# Patient Record
Sex: MALE | Race: WHITE | NOT HISPANIC OR LATINO | Employment: UNEMPLOYED | URBAN - METROPOLITAN AREA
[De-identification: names, ages, dates, MRNs, and addresses within clinical notes are randomized per-mention and may not be internally consistent; named-entity substitution may affect disease eponyms.]

---

## 2018-01-25 DIAGNOSIS — Z01.20 ENCOUNTER FOR DENTAL EXAM AND CLEANING W/O ABNORMAL FINDINGS: ICD-10-CM

## 2021-08-10 ENCOUNTER — CLINICAL SUPPORT (OUTPATIENT)
Dept: DENTISTRY | Facility: CLINIC | Age: 41
End: 2021-08-10

## 2021-08-10 VITALS — DIASTOLIC BLOOD PRESSURE: 77 MMHG | SYSTOLIC BLOOD PRESSURE: 114 MMHG | TEMPERATURE: 97.9 F | HEART RATE: 65 BPM

## 2021-08-10 DIAGNOSIS — Z01.20 ENCOUNTER FOR DENTAL EXAM AND CLEANING W/O ABNORMAL FINDINGS: Primary | ICD-10-CM

## 2021-08-10 PROCEDURE — D1110 PROPHYLAXIS - ADULT: HCPCS

## 2021-08-10 PROCEDURE — D0120 PERIODIC ORAL EVALUATION - ESTABLISHED PATIENT: HCPCS | Performed by: DENTIST

## 2021-08-10 PROCEDURE — D0274 BITEWINGS - 4 RADIOGRAPHIC IMAGES: HCPCS

## 2021-08-10 NOTE — PROGRESS NOTES
RECALL EXAM, ADULT PROPHY,  4 BWX     CHIEF CONCERN: none   PAIN SCALE: 0  ASA CLASS: II  PLAQUE: moderate   CALCULUS: no calculus noted  BLEEDING: moderate   STAIN :none  ORAL HYGIENE: fair  PERIO: some gingival inflammation present  Hand scaled, polished and flossed  Oral Hygiene Instruction :  recommended brushing 2 x daily for 2 minutes MIN, recommended flossing daily, reviewed dietary precautions  soft tissue evaluation    Soft tissue exam:  soft tissue exam was normal  ExtraOral exam:   Extraoral exam was normal    Dr Domenic Morton exam=   Reviewed with patient clinical and radiographic  findings and parent verbalized understanding  All questions and concerns addressed  On 2/21/20 recommendation for  consult presented to mother due to severe attrition/bruxism  wear of teeth  Mom was not interested, stated pt had seen specialist     And was told nothing could be done  The same recommendation was presentedtoday but mom again would like to leave as is and treat PRN  Pt and mom report no pain  No infection     At present       REFERRALS: no referrals needed    CARIES FINDINGS: no caries noted    Next Recall: 6 month recall ( last fmx 1/25/18 + bwx today 8/10/21)

## 2022-02-21 ENCOUNTER — CLINICAL SUPPORT (OUTPATIENT)
Dept: DENTISTRY | Facility: CLINIC | Age: 42
End: 2022-02-21

## 2022-02-21 VITALS — HEART RATE: 60 BPM | DIASTOLIC BLOOD PRESSURE: 84 MMHG | SYSTOLIC BLOOD PRESSURE: 131 MMHG | TEMPERATURE: 98.2 F

## 2022-02-21 DIAGNOSIS — Z01.20 ENCOUNTER FOR DENTAL EXAM AND CLEANING W/O ABNORMAL FINDINGS: Primary | ICD-10-CM

## 2022-02-21 PROCEDURE — D1110 PROPHYLAXIS - ADULT: HCPCS

## 2022-02-21 PROCEDURE — D0120 PERIODIC ORAL EVALUATION - ESTABLISHED PATIENT: HCPCS | Performed by: DENTIST

## 2022-02-21 NOTE — PROGRESS NOTES
RECALL EXAM, ADULT PROPHY  Rev Med hx with mom: mom reports no new health changes, no changes in meds or allergies  CHIEF CONCERN: both patient and mom report no dental pain or concerns  PAIN SCALE: 0  ASA CLASS: I  PLAQUE: moderate- heavy   CALCULUS: light calculus   BLEEDING:moderate- heavy  STAIN :none  ORAL HYGIENE: fair  PERIO: gingiva inflamed and bleeding  Severe attrition present  Hand scaled, polished and flossed  Oral Hygiene Instruction :  recommended brushing 2 x daily for 2 minutes MIN, recommended flossing daily, reviewed dietary precautions  Soft tissue exam:  soft tissue exam was normal  ExtraOral exam:   Extraoral exam was normal    Dr Liza Adam exam=   Reviewed with patient clinical  findings and patient verbalized understanding  All questions and concerns addressed       REFERRALS: no referrals needed    CARIES FINDINGS:  no caries noted    Next Recall: 6 month recall     Last BWX: 08/10/2021  Last FMX : 01/25/2018

## 2022-10-12 ENCOUNTER — OFFICE VISIT (OUTPATIENT)
Dept: DENTISTRY | Facility: CLINIC | Age: 42
End: 2022-10-12

## 2022-10-12 DIAGNOSIS — Z01.21 ENCOUNTER FOR DENTAL EXAMINATION AND CLEANING WITH ABNORMAL FINDINGS: Primary | ICD-10-CM

## 2022-10-12 PROCEDURE — D0120 PERIODIC ORAL EVALUATION - ESTABLISHED PATIENT: HCPCS | Performed by: DENTIST

## 2022-10-12 PROCEDURE — D1110 PROPHYLAXIS - ADULT: HCPCS

## 2022-10-12 PROCEDURE — D0274 BITEWINGS - 4 RADIOGRAPHIC IMAGES: HCPCS

## 2022-10-12 PROCEDURE — D0603 CARIES RISK ASSESSMENT AND DOCUMENTATION, WITH A FINDING OF HIGH RISK: HCPCS

## 2022-10-12 RX ORDER — ALLOPURINOL 100 MG/1
1 TABLET ORAL DAILY
COMMUNITY
Start: 2022-09-07

## 2022-10-12 RX ORDER — BETAMETHASONE DIPROPIONATE 0.5 MG/G
1 CREAM TOPICAL 2 TIMES DAILY
COMMUNITY
Start: 2022-09-14 | End: 2023-09-14

## 2022-10-12 RX ORDER — CLINDAMYCIN PHOSPHATE 10 MG/G
GEL TOPICAL
COMMUNITY
Start: 2022-07-26

## 2022-10-12 RX ORDER — KETOCONAZOLE 20 MG/ML
SHAMPOO TOPICAL
COMMUNITY
Start: 2022-09-14

## 2022-10-12 RX ORDER — CHOLECALCIFEROL (VITAMIN D3) 125 MCG
CAPSULE ORAL
COMMUNITY

## 2022-10-12 NOTE — PROGRESS NOTES
PERIODIC EXAM, ADULT PROPHY,  4 BWX    REVIEWED MED HX: meds, allergies, health changes reviewed in Epic  Downs Syndrome  CHIEF CONCERN:  none   PAIN SCALE:  0  ASA CLASS:  II  PLAQUE: moderate mainly linguals dianne molars  CALCULUS:  none  BLEEDING:  Moderate upon scaling  STAIN :   none  ORAL HYGIENE:  fair  PERIO: Gen 4mm naima UR and LR due to inflammation  Hand scaled, polished and flossed  Oral Hygiene Instruction:  recommended brushing 2 x daily for 2 minutes MIN, recommended flossing daily, reviewed dietary precautions  Visual and Tactile Intraoral/ Extraoral evaluation: Oral and Oropharyngeal cancer evaluation  No findings     Dr Demetrice Garcia exam=   Reviewed with patient clinical and radiographic findings and patient verbalized understanding  All questions and concerns addressed  Generalized attrition, worn to gingival margin  Generalized pulp exposure  According to Mom, per our recommendations to see a  since 2020, Mom is not interested at this time  They had seen a specialist in the past, and the doctor stated nothing could be done and to wear a NG  Mom states patient will not keep a NG in and at this point she would like him to be maintained w/ 6mrcs  She is aware eventually he will need FM extraction  Patient is currently not in pain and has no pain when eating and drinking  REFERRALS: Mom declined referral to   Continue to monitor here      Next Visit: 6mrc    Last [facility] 1850: 10-12-22  Last Panorex/ FMX : 1-25-18

## 2023-05-09 NOTE — PROGRESS NOTES
RECALL EXAM, ADULT PROPHY      REVIEWED MED HX: meds, allergies, health changes reviewed in EPIC  CHIEF CONCERN:  none   PAIN SCALE:  0  ASA CLASS:  I  PLAQUE:  mild    moderate    heavy  *  CALCULUS:  no calculus noted     light   moderate    heavy *  BLEEDING:   none    light   moderate   heavy  STAIN :   none    light    moderate   heavy*  ORAL HYGIENE:  good    fair   poor/needs improvement  PERIO:     Hand scaled, polished and flossed  Used Cavitron  Visual and Tactile Intraoral/ Extraoral evaluation: Oral and Oropharyngeal cancer evaluation  No findings     Dr Elenita Terrell  exam=   Reviewed with patient clinical and radiographic findings and patient verbalized understanding  All questions and concerns addressed       REFERRALS: no referrals needed    CARIES FINDINGS:        TREATMENT  PLAN :   1)     Next Recall: 6 month recall     Last BWX: 10/12/22  Last  FMX : 1/25/18

## 2023-05-10 ENCOUNTER — OFFICE VISIT (OUTPATIENT)
Dept: DENTISTRY | Facility: CLINIC | Age: 43
End: 2023-05-10

## 2023-05-10 VITALS — HEART RATE: 66 BPM | DIASTOLIC BLOOD PRESSURE: 79 MMHG | SYSTOLIC BLOOD PRESSURE: 121 MMHG

## 2023-05-10 DIAGNOSIS — Z01.20 ENCOUNTER FOR DENTAL EXAM AND CLEANING W/O ABNORMAL FINDINGS: Primary | ICD-10-CM

## 2023-05-10 NOTE — DENTAL PROCEDURE DETAILS
RECALL EXAM, ADULT PROPHY      REVIEWED MED HX: meds, allergies, health changes reviewed in EPIC  CHIEF CONCERN:  none   PAIN SCALE:  0  ASA CLASS:  II  PLAQUE:  moderate    CALCULUS: light    BLEEDING:  heavy  STAIN :   light    ORAL HYGIENE: poor/needs improvement  PERIO: inflammation     Hand scaled, polished and flossed  Used Cavitron  Visual and Tactile Intraoral/ Extraoral evaluation: Oral and Oropharyngeal cancer evaluation  No findings     Dr Gee Badillo exam=   Reviewed with patient clinical and radiographic findings and patient verbalized understanding  All questions and concerns addressed  REFERRALS: no referrals needed  Recommended referral to prosthodontics  Mom prefers to hold off for now       CARIES FINDINGS: no decay noted       TREATMENT  PLAN :   1) ext #12 root tip present/broken Lingual #11    Next Recall: 6 month recall     Last BWX: 10/12/22  Last  FMX : 1/25/18

## 2023-12-18 ENCOUNTER — OFFICE VISIT (OUTPATIENT)
Dept: DENTISTRY | Facility: CLINIC | Age: 43
End: 2023-12-18

## 2023-12-18 DIAGNOSIS — Z01.20 ENCOUNTER FOR DENTAL EXAM AND CLEANING W/O ABNORMAL FINDINGS: Primary | ICD-10-CM

## 2023-12-18 PROCEDURE — D1110 PROPHYLAXIS - ADULT: HCPCS

## 2023-12-18 PROCEDURE — D0274 BITEWINGS - 4 RADIOGRAPHIC IMAGES: HCPCS

## 2023-12-18 PROCEDURE — D0120 PERIODIC ORAL EVALUATION - ESTABLISHED PATIENT: HCPCS

## 2023-12-18 NOTE — PROGRESS NOTES
PERIODIC EXAM, ADULT PROPHY AND 4 BWX   REVIEWED MED HX: meds, allergies, health changes reviewed in [facility]. All consents signed.  CHIEF CONCERN:  none   PAIN SCALE:  0  ASA CLASS:  II  PLAQUE:   moderate    CALCULUS:  moderate    BLEEDING:    moderate    STAIN :   none    ORAL HYGIENE:     fair    PERIO:     Hand scaled, polished and flossed. Used Cavitron.     Oral Hygiene Instruction:  recommended brushing 2 x daily for 2 minutes MIN, recommended flossing daily, reviewed dietary precautions.  Dispensed: toothbrush, toothpaste and floss    Visual and Tactile Intraoral/ Extraoral evaluation: Oral and Oropharyngeal cancer evaluation. No findings       ***  exam=   Reviewed with patient clinical and radiographic findings and patient verbalized understanding. All questions and concerns addressed.     REFERRALS: no referrals needed    CARIES FINDINGS:        TREATMENT  PLAN :   1)     Next Recall: 6 month recall     Last BWX: 12/18/23  Last Panorex/ FMX : 1/25/18

## 2023-12-18 NOTE — DENTAL PROCEDURE DETAILS
Linnea Gasca presents for a Periodic exam. Verbal consent for treatment given in addition to the forms.     Reviewed health history - Patient is ASA II  Consents signed: Yes     Perio: Normal  Pain Scale: 0  Caries Assessment: Low  Radiographs: Bitewings x4         PERIODIC EXAM, ADULT PROPHY AND 4 BWX   REVIEWED MED HX: meds, allergies, health changes reviewed in [facility]. All consents signed.  CHIEF CONCERN:  none   PAIN SCALE:  0  ASA CLASS:  II  PLAQUE:   moderate    CALCULUS:  moderate    BLEEDING:    moderate    STAIN :   none    ORAL HYGIENE:     fair    PERIO: SEVERE ATTRITION    Hand scaled, polished and flossed. Used Cavitron.     Oral Hygiene Instruction:  recommended brushing 2 x daily for 2 minutes MIN, recommended flossing daily, reviewed dietary precautions.  Dispensed: toothbrush, toothpaste and floss    Visual and Tactile Intraoral/ Extraoral evaluation: Oral and Oropharyngeal cancer evaluation. No findings     Dr. CANTRELL  exam=   Reviewed with patient clinical and radiographic findings and patient verbalized understanding. All questions and concerns addressed.     REFERRALS: no referrals needed    CARIES FINDINGS: Severe attrition on all teeth. 2/21/20  spoke with mother regarding referral to  and also niteguard use. Mom states was at  and there is nothing they can do. Mom prefers to treat PRN and ext/ dentures for future tx.     Next Recall: 6 month recall     Last BWX: 12/18/23  Last Panorex/ FMX : 1/25/18

## 2024-01-18 ENCOUNTER — OFFICE VISIT (OUTPATIENT)
Dept: URGENT CARE | Facility: CLINIC | Age: 44
End: 2024-01-18
Payer: COMMERCIAL

## 2024-01-18 DIAGNOSIS — J01.00 ACUTE MAXILLARY SINUSITIS, RECURRENCE NOT SPECIFIED: Primary | ICD-10-CM

## 2024-01-18 PROCEDURE — 99213 OFFICE O/P EST LOW 20 MIN: CPT | Performed by: EMERGENCY MEDICINE

## 2024-01-18 RX ORDER — AMOXICILLIN 500 MG/1
500 CAPSULE ORAL 3 TIMES DAILY
Qty: 21 CAPSULE | Refills: 0 | Status: SHIPPED | OUTPATIENT
Start: 2024-01-18 | End: 2024-01-25

## 2024-01-18 NOTE — PROGRESS NOTES
[facility]        NAME: Linnea Gasca is a 43 y.o. male  : 1980    MRN: [id number]  DATE: 2024  TIME: 10:51 AM    Assessment and Plan   Acute maxillary sinusitis, recurrence not specified [J01.00]  1. Acute maxillary sinusitis, recurrence not specified  amoxicillin (AMOXIL) 500 mg capsule            Patient Instructions     Patient Instructions    Encourage liquids and rest  F/u with PCP in 2-3 days  Continue decongestants      Follow up with PCP in 3-5 days.  Proceed to  ER if symptoms worsen.    Chief Complaint     Chief Complaint   Patient presents with    Nasal Congestion     X 6 days. Taking decongestants w/o relief. .          History of Present Illness       44 yo w male with hx of Down's syndrome presents  with cc from mom that pt. Has been congested with productive white thick mucous.  No fever/chills.        Review of Systems   Review of Systems   Constitutional:  Negative for chills and fever.   HENT:  Positive for congestion, sinus pressure and sinus pain. Negative for rhinorrhea, sneezing, sore throat and trouble swallowing.    Eyes:  Negative for discharge and visual disturbance.   Respiratory:  Negative for shortness of breath and wheezing.    Cardiovascular:  Negative for chest pain and palpitations.   Gastrointestinal:  Negative for abdominal pain and vomiting.   Endocrine: Negative for polydipsia and polyuria.   Genitourinary:  Negative for dysuria and hematuria.   Musculoskeletal:  Negative for arthralgias, gait problem and neck stiffness.   Skin:  Negative for rash and wound.   Neurological:  Negative for dizziness and headaches.   Psychiatric/Behavioral:  Negative for confusion and suicidal ideas.          Current Medications       Current Outpatient Medications:     allopurinol (ZYLOPRIM) 100 mg tablet, Take 1 tablet by mouth daily, Disp: , Rfl:     amoxicillin (AMOXIL) 500 mg capsule, Take 1 capsule (500 mg total) by mouth 3 (three) times a day for 7 days, Disp:  21 capsule, Rfl: 0    Cholecalciferol (Vitamin D3) 50 MCG (2000 UT) TABS, , Disp: , Rfl:     clindamycin (CLINDAGEL) 1 % gel, Apply Topically twice a day (Face), Disp: , Rfl:     ketoconazole (NIZORAL) 2 % shampoo, apply topically twice a day to DAMP SKIN, LATHER, LEAVE ON 5 MINUTES, AND RINSE, Disp: , Rfl:     betamethasone dipropionate (DIPROSONE) 0.05 % cream, Apply 1 application topically 2 (two) times a day, Disp: , Rfl:     Current Allergies     Allergies as of 01/18/2024 - Reviewed 01/18/2024   Allergen Reaction Noted    Sweet potato - food allergy  01/16/2016            The following portions of the patient's history were reviewed and updated as appropriate: allergies, current medications, past family history, past medical history, past social history, past surgical history and problem list.     Past Medical History:   Diagnosis Date    Gout        History reviewed. No pertinent surgical history.    History reviewed. No pertinent family history.      Medications have been verified.        Objective   There were no vitals taken for this visit.       Physical Exam     Physical Exam  Vitals and nursing note reviewed.   Constitutional:       General: He is not in acute distress.     Appearance: Normal appearance. He is not ill-appearing, toxic-appearing or diaphoretic.      Comments: 44 yo male sitting on the exam table with erythematous facies around nasal region.   HENT:      Head: Normocephalic and atraumatic.      Right Ear: Tympanic membrane normal.      Left Ear: Tympanic membrane normal.      Nose: Congestion present.      Mouth/Throat:      Mouth: Mucous membranes are moist.      Pharynx: Oropharynx is clear. No oropharyngeal exudate or posterior oropharyngeal erythema.   Eyes:      Extraocular Movements: Extraocular movements intact.      Pupils: Pupils are equal, round, and reactive to light.   Neck:      Vascular: No carotid bruit.   Cardiovascular:      Rate and Rhythm: Normal rate and regular  rhythm.      Pulses: Normal pulses.   Pulmonary:      Effort: Pulmonary effort is normal.   Abdominal:      General: Abdomen is flat. Bowel sounds are normal. There is no distension.      Palpations: Abdomen is soft. There is no mass.      Tenderness: There is no abdominal tenderness. There is no right CVA tenderness, left CVA tenderness, guarding or rebound.      Hernia: No hernia is present.   Musculoskeletal:         General: No swelling, tenderness, deformity or signs of injury. Normal range of motion.      Cervical back: Normal range of motion and neck supple. No rigidity. No muscular tenderness.      Right lower leg: No edema.      Left lower leg: No edema.   Lymphadenopathy:      Cervical: No cervical adenopathy.   Skin:     General: Skin is warm and dry.      Coloration: Skin is not jaundiced or pale.      Findings: No bruising, erythema, lesion or rash.   Neurological:      General: No focal deficit present.      Mental Status: He is alert.      Cranial Nerves: No cranial nerve deficit.      Motor: No weakness.      Comments: + Down's syndrome   Psychiatric:         Mood and Affect: Mood normal.

## 2024-01-29 ENCOUNTER — APPOINTMENT (OUTPATIENT)
Dept: LAB | Facility: CLINIC | Age: 44
End: 2024-01-29
Payer: MEDICARE

## 2024-06-25 NOTE — PROGRESS NOTES
PERIODIC EXAM, ADULT PROPHY , (no xrays due )   REVIEWED MED HX: meds, allergies, health changes reviewed in [facility]. All consents signed.  CHIEF CONCERN: no dental pain or concerns  PAIN SCALE:  0  ASA CLASS:  II  PLAQUE:  {Plaque Level:97604}  CALCULUS:  {None light moderate heavy:42020}  BLEEDING:   {None light moderate heavy:01505}  STAIN :   {None light moderate heavy:06948}      PERIO:     Hygiene Procedures:  Scaled, Polished, Flossed and Used Cavitron    Oral Hygiene Instruction: Brushing Minimum 2x daily for 2 minutes, daily flossing and Reviewed dietary precautions    Dispensed: Toothbrush, Toothpaste, and Floss    Visual and Tactile Intraoral/ Extraoral evaluation: Oral and Oropharyngeal cancer evaluation. No findings     {Exam:48547}   Reviewed with patient clinical and radiographic findings and patient verbalized understanding. All questions and concerns addressed.     REFERRALS: none    CARIES FINDINGS:        TREATMENT  PLAN :   NV:     Next Recall: 6 month recall     Last BWX: 12/18/23  Last FMX : 1/25/18

## 2024-06-26 ENCOUNTER — OFFICE VISIT (OUTPATIENT)
Dept: DENTISTRY | Facility: CLINIC | Age: 44
End: 2024-06-26

## 2024-06-26 VITALS — DIASTOLIC BLOOD PRESSURE: 69 MMHG | HEART RATE: 71 BPM | SYSTOLIC BLOOD PRESSURE: 105 MMHG

## 2024-06-26 DIAGNOSIS — Z01.20 ENCOUNTER FOR DENTAL EXAM AND CLEANING W/O ABNORMAL FINDINGS: Primary | ICD-10-CM

## 2024-06-26 PROCEDURE — D0120 PERIODIC ORAL EVALUATION - ESTABLISHED PATIENT: HCPCS | Performed by: DENTIST

## 2024-06-26 PROCEDURE — D1110 PROPHYLAXIS - ADULT: HCPCS

## 2024-06-26 NOTE — DENTAL PROCEDURE DETAILS
PERIODIC EXAM, ADULT PROPHY , (no xrays due)   REVIEWED MED HX: meds, allergies, health changes reviewed in [facility]. All consents signed.  CHIEF CONCERN: no dental pain or concerns  PAIN SCALE:  0  ASA CLASS:  II  PLAQUE:  moderate  CALCULUS:  none  BLEEDING:   moderate  STAIN :   none      PERIO: gingivitis present    Hygiene Procedures:  Scaled, Polished, Flossed and Used Cavitron    Oral Hygiene Instruction: Brushing Minimum 2x daily for 2 minutes, daily flossing and Reviewed dietary precautions    Dispensed: Toothbrush, Toothpaste, and Floss    Visual and Tactile Intraoral/ Extraoral evaluation: Oral and Oropharyngeal cancer evaluation. No findings     Dr. Ponce   Reviewed with patient clinical and radiographic findings and patient verbalized understanding. All questions and concerns addressed.     REFERRALS: none    CARIES FINDINGS: some teeth need extraction. Worn down. Severe attrition present generalized. Previously discussed with mom need to go to specialist / for full mouth rehab/ advanced case but mom prefers not to do anything at this time.   #8 PAP -recommended extraction, but mom prefers to hold off for now.    Next Recall: 6 month recall / update fmx    Last BWX: 12/18/23  Last FMX : 1/25/18

## 2024-10-16 ENCOUNTER — OFFICE VISIT (OUTPATIENT)
Dept: FAMILY MEDICINE CLINIC | Facility: CLINIC | Age: 44
End: 2024-10-16
Payer: MEDICARE

## 2024-10-16 VITALS
HEIGHT: 64 IN | DIASTOLIC BLOOD PRESSURE: 74 MMHG | BODY MASS INDEX: 35.17 KG/M2 | WEIGHT: 206 LBS | TEMPERATURE: 97.9 F | HEART RATE: 76 BPM | RESPIRATION RATE: 18 BRPM | SYSTOLIC BLOOD PRESSURE: 132 MMHG

## 2024-10-16 DIAGNOSIS — Q90.9 DOWN SYNDROME: ICD-10-CM

## 2024-10-16 DIAGNOSIS — M10.9 GOUT, UNSPECIFIED CAUSE, UNSPECIFIED CHRONICITY, UNSPECIFIED SITE: ICD-10-CM

## 2024-10-16 PROCEDURE — G2211 COMPLEX E/M VISIT ADD ON: HCPCS | Performed by: FAMILY MEDICINE

## 2024-10-16 PROCEDURE — 99214 OFFICE O/P EST MOD 30 MIN: CPT | Performed by: FAMILY MEDICINE

## 2024-10-16 RX ORDER — LORATADINE 10 MG/1
CAPSULE, LIQUID FILLED ORAL
COMMUNITY

## 2024-10-16 RX ORDER — LEVOCETIRIZINE DIHYDROCHLORIDE 5 MG/1
1 TABLET, FILM COATED ORAL EVERY EVENING
COMMUNITY
Start: 2024-07-24

## 2024-10-16 RX ORDER — CLOBETASOL PROPIONATE 0.5 MG/ML
1 SOLUTION TOPICAL 2 TIMES DAILY
COMMUNITY
Start: 2024-03-18 | End: 2025-03-18

## 2024-10-16 NOTE — PROGRESS NOTES
Ambulatory Visit  Name: Linnea Gasca      : 1980      MRN: [id number]  Encounter Provider: Nicolas Wyatt MD  Encounter Date: 10/16/2024   Encounter department: [facility]    Assessment & Plan  BMI 33.0-33.9,adult  Patient is stable  and will continue present plan of care and reassess at next routine visit. All questions about this problem from patient were answered today.          Down syndrome  Patient is stable  and will continue present plan of care and reassess at next routine visit. All questions about this problem from patient were answered today.          Gout, unspecified cause, unspecified chronicity, unspecified site  Pt to continue with current gout meds and avoid ETOH, foods with lots of purines and increase water intake. All patient questions about this condition answered today            Depression Screening and Follow-up Plan: Patient was screened for depression during today's encounter. They screened negative with a PHQ-2 score of 0.    History of Present Illness     HPI      Review of Systems   Constitutional:  Negative for activity change, appetite change, chills, fatigue, fever and unexpected weight change.   HENT:  Negative for congestion, ear pain, hearing loss, mouth sores, postnasal drip, sinus pressure, sinus pain, sneezing and sore throat.    Respiratory:  Negative for apnea, cough, shortness of breath and wheezing.    Cardiovascular:  Negative for chest pain, palpitations and leg swelling.   Gastrointestinal:  Negative for abdominal pain, constipation, diarrhea, nausea and vomiting.   Endocrine: Negative for cold intolerance and heat intolerance.   Genitourinary:  Negative for dysuria, frequency and hematuria.   Musculoskeletal:  Negative for arthralgias, back pain, gait problem, joint swelling and neck pain.   Skin:  Negative for rash.   Neurological:  Negative for dizziness, weakness and numbness.   Hematological:  Does not bruise/bleed easily.  "  Psychiatric/Behavioral:  Negative for agitation, behavioral problems, confusion, hallucinations and sleep disturbance. The patient is not nervous/anxious.         Limited behavior issues           Objective     /74 (BP Location: Left arm, Patient Position: Sitting, Cuff Size: Standard)   Pulse 76   Temp 97.9 °F (36.6 °C)   Resp 18   Ht 5' 4\" (1.626 m)   Wt 93.4 kg (206 lb)   BMI 35.36 kg/m²     Physical Exam  Constitutional:       Appearance: He is well-developed. He is obese.   HENT:      Head: Normocephalic and atraumatic.      Right Ear: External ear normal.      Left Ear: External ear normal.      Nose: Nose normal.      Mouth/Throat:      Pharynx: Oropharynx is clear. No oropharyngeal exudate.   Eyes:      General: No scleral icterus.     Conjunctiva/sclera: Conjunctivae normal.      Pupils: Pupils are equal, round, and reactive to light.   Cardiovascular:      Rate and Rhythm: Normal rate and regular rhythm.      Heart sounds: Murmur heard.   Pulmonary:      Effort: Pulmonary effort is normal.      Breath sounds: Normal breath sounds. No wheezing or rales.   Abdominal:      General: Bowel sounds are normal.      Palpations: Abdomen is soft.      Tenderness: There is no abdominal tenderness. There is no guarding.   Musculoskeletal:         General: Normal range of motion.      Cervical back: Normal range of motion and neck supple.   Skin:     General: Skin is warm and dry.      Findings: No erythema or rash.   Neurological:      Mental Status: He is alert and oriented to person, place, and time. Mental status is at baseline.      Comments: Limited due to DOWNs   Psychiatric:         Mood and Affect: Mood normal.         Behavior: Behavior normal.         "

## 2024-10-16 NOTE — ASSESSMENT & PLAN NOTE
Pt to continue with current gout meds and avoid ETOH, foods with lots of purines and increase water intake. All patient questions about this condition answered today

## 2025-01-05 DIAGNOSIS — L70.9 ACNE, UNSPECIFIED ACNE TYPE: ICD-10-CM

## 2025-01-05 DIAGNOSIS — M10.9 GOUT, UNSPECIFIED CAUSE, UNSPECIFIED CHRONICITY, UNSPECIFIED SITE: Primary | ICD-10-CM

## 2025-01-05 DIAGNOSIS — B35.0 TINEA CAPITIS: ICD-10-CM

## 2025-01-05 DIAGNOSIS — B35.6 TINEA CRURIS: ICD-10-CM

## 2025-01-05 DIAGNOSIS — J30.1 SEASONAL ALLERGIC RHINITIS DUE TO POLLEN: ICD-10-CM

## 2025-01-06 ENCOUNTER — OFFICE VISIT (OUTPATIENT)
Dept: DENTISTRY | Facility: CLINIC | Age: 45
End: 2025-01-06

## 2025-01-06 VITALS — DIASTOLIC BLOOD PRESSURE: 78 MMHG | SYSTOLIC BLOOD PRESSURE: 114 MMHG | HEART RATE: 78 BPM

## 2025-01-06 DIAGNOSIS — K03.0: ICD-10-CM

## 2025-01-06 DIAGNOSIS — Z01.20 ENCOUNTER FOR DENTAL EXAM AND CLEANING W/O ABNORMAL FINDINGS: Primary | ICD-10-CM

## 2025-01-06 PROCEDURE — D0220 INTRAORAL - PERIAPICAL FIRST RADIOGRAPHIC IMAGE: HCPCS

## 2025-01-06 PROCEDURE — D0120 PERIODIC ORAL EVALUATION - ESTABLISHED PATIENT: HCPCS

## 2025-01-06 PROCEDURE — D0274 BITEWINGS - 4 RADIOGRAPHIC IMAGES: HCPCS

## 2025-01-06 PROCEDURE — D1110 PROPHYLAXIS - ADULT: HCPCS

## 2025-01-06 NOTE — PROGRESS NOTES
PERIODIC EXAM, ADULT PROPHY , 4 BWX, 1 PA UL (broken tooth)   REVIEWED MED HX: meds, allergies, health changes reviewed in [facility]. All consents signed.  CHIEF CONCERN: no dental pain or concerns  PAIN SCALE:  0  ASA CLASS:  II  PLAQUE:  mild  CALCULUS:  none  BLEEDING:   heavy  STAIN :   none      PERIO: a lot of bleeding. Severe attrition present    Hygiene Procedures:  Scaled, Polished, Flossed and Placement of Wonderful Fl varnish    Oral Hygiene Instruction: Brushing Minimum 2x daily for 2 minutes, daily flossing and Reviewed dietary precautions    Dispensed: Toothbrush, Toothpaste, Floss    Visual and Tactile Intraoral/ Extraoral evaluation: Oral and Oropharyngeal cancer evaluation. No findings     Dr. Barrett   Reviewed with patient clinical and radiographic findings and patient verbalized understanding. All questions and concerns addressed.   --> severe attrition on all teeth, #8 and 12 previously tx planned for extraction. Added #13 (tooth broken into gumline)    REFERRALS: none    CARIES FINDINGS: no decay noted       TREATMENT  PLAN :   NV: extractions# 8, 12, 13 - brought mom to tx room showed Broken tooth #13  - mom asked re: if all exts done at once. Mom concerned if pt would keep cause in with bleeding.   Next Recall: 6 month recall     Last BWX:1/6/25  Last Panorex/ FMX : 1/25/18

## 2025-01-07 RX ORDER — ALLOPURINOL 100 MG/1
100 TABLET ORAL DAILY
Qty: 30 TABLET | Refills: 0 | Status: SHIPPED | OUTPATIENT
Start: 2025-01-07

## 2025-01-07 RX ORDER — KETOCONAZOLE 20 MG/ML
1 SHAMPOO, SUSPENSION TOPICAL 3 TIMES WEEKLY
Qty: 120 ML | Refills: 0 | Status: SHIPPED | OUTPATIENT
Start: 2025-01-08

## 2025-01-07 RX ORDER — CLINDAMYCIN PHOSPHATE 10 MG/G
GEL TOPICAL 2 TIMES DAILY
Qty: 60 G | Refills: 2 | Status: SHIPPED | OUTPATIENT
Start: 2025-01-07

## 2025-01-07 RX ORDER — LEVOCETIRIZINE DIHYDROCHLORIDE 5 MG/1
5 TABLET, FILM COATED ORAL EVERY EVENING
Qty: 30 TABLET | Refills: 0 | Status: SHIPPED | OUTPATIENT
Start: 2025-01-07

## 2025-02-12 ENCOUNTER — OFFICE VISIT (OUTPATIENT)
Dept: DENTISTRY | Facility: CLINIC | Age: 45
End: 2025-02-12

## 2025-02-12 DIAGNOSIS — K03.0: Primary | ICD-10-CM

## 2025-02-12 PROCEDURE — D7140 EXTRACTION, ERUPTED TOOTH OR EXPOSED ROOT (ELEVATION AND/OR FORCEPS REMOVAL): HCPCS

## 2025-02-12 NOTE — PROGRESS NOTES
.Extraction #8, 12, 13    Linnea Gasca 44 y.o. male presents with mom to [facility] for extraction #8, 12, 13  PMH reviewed, no changes, ASA II. Significant medical history: Down Syndrome. Significant allergies: none. Significant medications: none.  Pain level 0/10    Diagnosis:  Teeth #8, 12, 13 indicated for extraction due to Retained root tip.    Consent:  Risks of specific procedure: pain, bleeding, swelling, infection, tooth fracturing to point of requiring surgical removal, damage to adjacent teeth and/or restorations on them.  Risks of any dental procedure: post procedural pain or sensitivity, local anesthetic side effects, allergic reaction to dental materials and medications, breakage of local anesthetic needle, aspiration of small dental tools, injury to nearby hard and soft tissues and anatomical structures.  Benefits: relieve pain or underlying infection, prevent future or further progression of infection  Alternatives:  no tx.  Tx plan for extraction #8, 12, 13 reviewed, pt given opportunity to ask questions, all questions answered to degree of medical and dental certainty.  Patient understands and consent given by self and mom via signed oral surgery informed consent.    Universal Protocol  Other Assisting Provider: Yes, Mabel (assistant)  Verbal consent obtained? YES  Written consent obtained?  YES  Risks, benefits and alternatives discussed?: YES  Consent given by: self and mom  Time Out  Immediately prior to the procedure a time out was called: YES  Time Out:  Time Out performed at:  10:25 AM  A time out verifies correct patient, procedure, equipment, support staff and site/side marked as required.  Patient states understanding of procedure being performed: YES  Patient's understanding of procedure matches consent: YES  Procedure consent matches procedure scheduled: YES  Test results available and properly labeled: N/A  Site  Verified with the patient  YES  Radiology Images displayed and confirmed.  If  images not available, report reviewed:  YES  Required items - Required blood products, implants, devices and special equipment available: YES  Patient identity confirmed:  YES    Anesthesia:  Topical 20% benzocaine.  1 carps 4% Septocaine 1:100k epi via buccal infiltration and palatal/lingual infiltration.    Patient stated that he was shaking after the injection, so I sat his up and put a cold water paper towel on his forehead and ice pack on the back of neck. Gave patient water to rinse mouth with and gave him a few minutes to breath until he felt better.    Patient started to feel better after about 5-10 mins and he agreed to continuing the extractions. After extractions were completed and sterile saline was used, patient started to feel shakey again    Procedure details:  Reflected gingiva with periosteal elevator.  Elevated, and extracted #8, 12, 13 with straight elevator and forceps.  Socket curetted and irrigated with sterile saline.  Manual alveolar compression with gauze 30 seconds. Hemostasis achieved.    Post-op instructions given verbally and on paper.  Patient given ice and gauze.    Let patient take his time before getting out of the chair. He left stable and said that he felt much better. Discussed with mom that he was feeling light headed and anxious and just to keep an eye on him    Rx: None.    Patient dismissed ambulatory and alert.    NV: 6 month recall.    Attending: Dr. Ponce was present in clinic.

## 2025-02-25 DIAGNOSIS — M10.9 GOUT, UNSPECIFIED CAUSE, UNSPECIFIED CHRONICITY, UNSPECIFIED SITE: ICD-10-CM

## 2025-02-26 RX ORDER — ALLOPURINOL 100 MG/1
100 TABLET ORAL DAILY
Qty: 30 TABLET | Refills: 5 | Status: SHIPPED | OUTPATIENT
Start: 2025-02-26

## 2025-02-26 RX ORDER — ALLOPURINOL 100 MG/1
100 TABLET ORAL DAILY
Qty: 30 TABLET | Refills: 0 | Status: SHIPPED | OUTPATIENT
Start: 2025-02-26

## 2025-03-02 DIAGNOSIS — L70.9 ACNE, UNSPECIFIED ACNE TYPE: ICD-10-CM

## 2025-03-03 RX ORDER — CLINDAMYCIN PHOSPHATE 10 MG/G
GEL TOPICAL 2 TIMES DAILY
Qty: 60 G | Refills: 0 | Status: SHIPPED | OUTPATIENT
Start: 2025-03-03

## 2025-04-02 ENCOUNTER — RA CDI HCC (OUTPATIENT)
Dept: OTHER | Facility: HOSPITAL | Age: 45
End: 2025-04-02

## 2025-04-09 ENCOUNTER — OFFICE VISIT (OUTPATIENT)
Dept: FAMILY MEDICINE CLINIC | Facility: CLINIC | Age: 45
End: 2025-04-09
Payer: MEDICARE

## 2025-04-09 VITALS
DIASTOLIC BLOOD PRESSURE: 70 MMHG | SYSTOLIC BLOOD PRESSURE: 118 MMHG | WEIGHT: 203 LBS | HEIGHT: 64 IN | HEART RATE: 66 BPM | OXYGEN SATURATION: 94 % | TEMPERATURE: 98.2 F | BODY MASS INDEX: 34.66 KG/M2

## 2025-04-09 DIAGNOSIS — L70.8 OTHER ACNE: ICD-10-CM

## 2025-04-09 DIAGNOSIS — L70.9 ACNE, UNSPECIFIED ACNE TYPE: ICD-10-CM

## 2025-04-09 DIAGNOSIS — M1A.9XX0 CHRONIC GOUT WITHOUT TOPHUS, UNSPECIFIED CAUSE, UNSPECIFIED SITE: ICD-10-CM

## 2025-04-09 DIAGNOSIS — R53.83 OTHER FATIGUE: ICD-10-CM

## 2025-04-09 DIAGNOSIS — L21.9 SEBORRHEIC DERMATITIS OF SCALP: ICD-10-CM

## 2025-04-09 DIAGNOSIS — Z13.220 SCREENING FOR CHOLESTEROL LEVEL: ICD-10-CM

## 2025-04-09 DIAGNOSIS — Z11.4 ENCOUNTER FOR SCREENING FOR HIV: ICD-10-CM

## 2025-04-09 DIAGNOSIS — I36.1 NONRHEUMATIC TRICUSPID VALVE REGURGITATION: ICD-10-CM

## 2025-04-09 DIAGNOSIS — J30.1 SEASONAL ALLERGIC RHINITIS DUE TO POLLEN: ICD-10-CM

## 2025-04-09 DIAGNOSIS — M10.9 GOUT, UNSPECIFIED CAUSE, UNSPECIFIED CHRONICITY, UNSPECIFIED SITE: ICD-10-CM

## 2025-04-09 DIAGNOSIS — Z11.59 ENCOUNTER FOR HEPATITIS C SCREENING TEST FOR LOW RISK PATIENT: ICD-10-CM

## 2025-04-09 DIAGNOSIS — Q90.9 DOWN SYNDROME: ICD-10-CM

## 2025-04-09 DIAGNOSIS — Z00.00 MEDICARE ANNUAL WELLNESS VISIT, SUBSEQUENT: Primary | ICD-10-CM

## 2025-04-09 PROCEDURE — 99214 OFFICE O/P EST MOD 30 MIN: CPT | Performed by: FAMILY MEDICINE

## 2025-04-09 PROCEDURE — G0439 PPPS, SUBSEQ VISIT: HCPCS | Performed by: FAMILY MEDICINE

## 2025-04-09 PROCEDURE — G2211 COMPLEX E/M VISIT ADD ON: HCPCS | Performed by: FAMILY MEDICINE

## 2025-04-09 RX ORDER — CLINDAMYCIN PHOSPHATE 10 MG/G
GEL TOPICAL 2 TIMES DAILY
Start: 2025-04-09

## 2025-04-09 RX ORDER — LEVOCETIRIZINE DIHYDROCHLORIDE 5 MG/1
5 TABLET, FILM COATED ORAL EVERY EVENING
Start: 2025-04-09

## 2025-04-09 RX ORDER — ALLOPURINOL 100 MG/1
100 TABLET ORAL DAILY
Start: 2025-04-09

## 2025-04-09 NOTE — PROGRESS NOTES
Name: Linnea Gasca      : 1980      MRN: [id number]  Encounter Provider: Nicolas Wyatt MD  Encounter Date: 2025   Encounter department: [facility]  :  Assessment & Plan  Medicare annual wellness visit, subsequent         Down syndrome  Patient is stable  and will continue present plan of care and reassess at next routine visit. All questions about this problem from patient were answered today.        Gout, unspecified cause, unspecified chronicity, unspecified site  Pt to continue with current gout meds and avoid ETOH, foods with lots of purines and increase water intake. All patient questions about this condition answered today   Orders:  •  Uric acid; Future  •  allopurinol (ZYLOPRIM) 100 mg tablet; Take 1 tablet (100 mg total) by mouth daily    Other acne  Patient is stable  and will continue present plan of care and reassess at next routine visit. All questions about this problem from patient were answered today.        Seborrheic dermatitis of scalp  Patient is stable  and will continue present plan of care and reassess at next routine visit. All questions about this problem from patient were answered today.        Nonrheumatic tricuspid valve regurgitation    Orders:  •  Echo complete w/ contrast if indicated; Future    Chronic gout without tophus, unspecified cause, unspecified site  Pt to continue with current gout meds and avoid ETOH, foods with lots of purines and increase water intake. All patient questions about this condition answered today        Other fatigue    Orders:  •  Comprehensive metabolic panel; Future  •  CBC and differential; Future  •  TSH, 3rd generation with Free T4 reflex; Future  •  Magnesium; Future  •  UA/M w/rflx Culture, Comp    Screening for cholesterol level    Orders:  •  Lipid Panel with Direct LDL reflex; Future  •  Lipid Panel with Direct LDL reflex; Future    Seasonal allergic rhinitis due to pollen    Orders:  •  levocetirizine  "(XYZAL) 5 MG tablet; Take 1 tablet (5 mg total) by mouth every evening    Acne, unspecified acne type    Orders:  •  clindamycin 1 % gel; Apply topically 2 (two) times a day    Encounter for screening for HIV    Orders:  •  HIV 1/2 AG/AB w Reflex [facility] for 2 yr old and above; Future    Encounter for hepatitis C screening test for low risk patient    Orders:  •  Comprehensive metabolic panel; Future           History of Present Illness   HPI  Review of Systems    Objective   /70 (BP Location: Left arm, Patient Position: Sitting, Cuff Size: Standard)   Pulse 66   Temp 98.2 °F (36.8 °C)   Ht 5' 4\" (1.626 m)   Wt 92.1 kg (203 lb)   SpO2 94%   BMI 34.84 kg/m²      Physical Exam    Answers submitted by the patient for this visit:  Medicare Annual Wellness Visit (Submitted on 4/2/2025)  How would you rate your overall health?: good  Compared to last year, how is your physical health?: same  In general, how satisfied are you with your life?: satisfied  Compared to last year, how is your eyesight?: same  Compared to last year, how is your hearing?: same  Compared to last year, how is your emotional/mental health?: same  How often is anger a problem for you?: never, rarely  How often do you feel unusually tired/fatigued?: never, rarely  In the past 7 days, how much pain have you experienced?: none  In the past 6 months, have you lost or gained 10 pounds without trying?: No  One or more falls in the last year: No  Do you have trouble with the stairs inside or outside your home?: No  Does your home have working smoke alarms?: Yes  Does your home have a carbon monoxide monitor?: No  Which safety hazards (if any) have you experienced in your home? Please select all that apply.: none  How would you describe your current diet? Please select all that apply.: Regular, Limited junk food  In addition to prescription medications, are you taking any over-the-counter supplements?: No  Can you manage your medications?: Yes  Are " you currently taking any opioid medications?: No  Can you walk and transfer into and out of your bed and chair?: Yes  Can you dress and groom yourself?: Yes  Can you bathe or shower yourself?: No  Can you feed yourself?: Yes  Can you do your laundry/ housekeeping?: No  Can you manage your money, pay your bills, and track your expenses?: No  Can you make your own meals?: No  Can you do your own shopping?: No  Within the last 12 months, have you had any hospitalizations or Emergency Department visits?: No  Do you have a living will?: No  Do you have a Durable POA (Power of ) for healthcare decisions?: No  Do you have an Advanced Directive for end of life decisions?: No  How often have you used an illegal drug (including marijuana) or a prescription medication for non-medical reasons in the past year?: never  What is the typical number of drinks you consume in a day?: 0  What is the typical number of drinks you consume in a week?: 0  How often did you have a drink containing alcohol in the past year?: never  How many drinks did you have on a typical day  when you were drinking in the past year?: 0  How often did you have 6 or more drinks on one occasion in the past year?: never

## 2025-04-09 NOTE — ASSESSMENT & PLAN NOTE
Orders:  •  Uric acid; Future  •  allopurinol (ZYLOPRIM) 100 mg tablet; Take 1 tablet (100 mg total) by mouth daily

## 2025-04-09 NOTE — PROGRESS NOTES
Name: Linnea Gasca      : 1980      MRN: [id number]  Encounter Provider: Nicolas Wyatt MD  Encounter Date: 2025   Encounter department: [facility]  :  Assessment & Plan  Medicare annual wellness visit, subsequent         Down syndrome         Gout, unspecified cause, unspecified chronicity, unspecified site    Orders:  •  Uric acid; Future  •  allopurinol (ZYLOPRIM) 100 mg tablet; Take 1 tablet (100 mg total) by mouth daily    Other acne         Seborrheic dermatitis of scalp         Nonrheumatic tricuspid valve regurgitation    Orders:  •  Echo complete w/ contrast if indicated; Future    Chronic gout without tophus, unspecified cause, unspecified site         Other fatigue    Orders:  •  Comprehensive metabolic panel; Future  •  CBC and differential; Future  •  TSH, 3rd generation with Free T4 reflex; Future  •  Magnesium; Future  •  UA/M w/rflx Culture, Comp    Screening for cholesterol level    Orders:  •  Lipid Panel with Direct LDL reflex; Future  •  Lipid Panel with Direct LDL reflex; Future    Seasonal allergic rhinitis due to pollen    Acne, unspecified acne type    Encounter for screening for HIV    Encounter for hepatitis C screening test for low risk patient       Preventive health issues were discussed with patient, and age appropriate screening tests were ordered as noted in patient's After Visit Summary. Personalized health advice and appropriate referrals for health education or preventive services given if needed, as noted in patient's After Visit Summary.    History of Present Illness     HPI   Patient Care Team:  Nicolas Wyatt MD as PCP - General (Family Medicine)  Nicolas Wyatt MD (Family Medicine)    Review of Systems  Medical History Reviewed by provider this encounter:       Annual Wellness Visit Questionnaire       Health Risk Assessment:   Patient rates overall health as good. Patient feels that their physical health rating is same. Patient  is satisfied with their life. Eyesight was rated as same. Hearing was rated as same. Patient feels that their emotional and mental health rating is same. Patients states they are never, rarely angry. Patient states they are never, rarely unusually tired/fatigued. Pain experienced in the last 7 days has been none. Patient states that he has experienced no weight loss or gain in last 6 months.     Depression Screening:   PHQ-2 Score: 0      Fall Risk Screening:   In the past year, patient has experienced: no history of falling in past year      Home Safety:  Patient does not have trouble with stairs inside or outside of their home. Patient has working smoke alarms and has no working carbon monoxide detector. Home safety hazards include: none.     Nutrition:   Current diet is Regular and Limited junk food.     Medications:   Patient is not currently taking any over-the-counter supplements. Patient is able to manage medications.     Activities of Daily Living (ADLs)/Instrumental Activities of Daily Living (IADLs):   Walk and transfer into and out of bed and chair?: Yes  Dress and groom yourself?: Yes    Bathe or shower yourself?: No    Feed yourself? Yes  Do your laundry/housekeeping?: No  Manage your money, pay your bills and track your expenses?: No  Make your own meals?: No    Do your own shopping?: No    Previous Hospitalizations:   Any hospitalizations or ED visits within the last 12 months?: No      Advance Care Planning:   Living will: No    Durable POA for healthcare: No    Advanced directive: No      Preventive Screenings      Cardiovascular Screening:    General: Screening Current      Prostate Cancer Screening:    General: Screening Not Indicated      Lung Cancer Screening:     General: Screening Not Indicated    Screening, Brief Intervention, and Referral to Treatment (SBIRT)     Screening  Typical number of drinks in a day: 0  Typical number of drinks in a week: 0  Interpretation: Low risk drinking  "behavior.    AUDIT-C Screenin) How often did you have a drink containing alcohol in the past year? never  2) How many drinks did you have on a typical day when you were drinking in the past year? 0  3) How often did you have 6 or more drinks on one occasion in the past year? never    AUDIT-C Score: 0  Interpretation: Score 0-3 (male): Negative screen for alcohol misuse    Single Item Drug Screening:  How often have you used an illegal drug (including marijuana) or a prescription medication for non-medical reasons in the past year? never    Single Item Drug Screen Score: 0  Interpretation: Negative screen for possible drug use disorder    Social Drivers of Health     Financial Resource Strain: Low Risk  (2024)    Overall Financial Resource Strain (CARDIA)    • Difficulty of Paying Living Expenses: Not hard at all   Food Insecurity: No Food Insecurity (2025)    Hunger Vital Sign    • Worried About Running Out of Food in the Last Year: Never true    • Ran Out of Food in the Last Year: Never true   Transportation Needs: No Transportation Needs (2025)    PRAPARE - Transportation    • Lack of Transportation (Medical): No    • Lack of Transportation (Non-Medical): No   Housing Stability: Unknown (2025)    Housing Stability Vital Sign    • Unable to Pay for Housing in the Last Year: Patient declined    • Number of Times Moved in the Last Year: 0    • Homeless in the Last Year: No   Utilities: Not At Risk (2025)    [facility] Utilities    • Threatened with loss of utilities: No     No results found.    Objective   /70 (BP Location: Left arm, Patient Position: Sitting, Cuff Size: Standard)   Pulse 66   Temp 98.2 °F (36.8 °C)   Ht 5' 4\" (1.626 m)   Wt 92.1 kg (203 lb)   SpO2 94%   BMI 34.84 kg/m²     Physical Exam    "

## 2025-04-09 NOTE — ASSESSMENT & PLAN NOTE
Pt to continue with current gout meds and avoid ETOH, foods with lots of purines and increase water intake. All patient questions about this condition answered today   Orders:  •  Uric acid; Future  •  allopurinol (ZYLOPRIM) 100 mg tablet; Take 1 tablet (100 mg total) by mouth daily

## 2025-05-18 DIAGNOSIS — M10.9 GOUT, UNSPECIFIED CAUSE, UNSPECIFIED CHRONICITY, UNSPECIFIED SITE: ICD-10-CM

## 2025-05-18 DIAGNOSIS — B35.0 TINEA CAPITIS: ICD-10-CM

## 2025-05-19 RX ORDER — ALLOPURINOL 100 MG/1
100 TABLET ORAL DAILY
Qty: 30 TABLET | Refills: 1 | Status: SHIPPED | OUTPATIENT
Start: 2025-05-19

## 2025-05-19 RX ORDER — KETOCONAZOLE 20 MG/ML
1 SHAMPOO, SUSPENSION TOPICAL 3 TIMES WEEKLY
Qty: 120 ML | Refills: 0 | Status: SHIPPED | OUTPATIENT
Start: 2025-05-19

## 2025-05-23 DIAGNOSIS — L70.9 ACNE, UNSPECIFIED ACNE TYPE: ICD-10-CM

## 2025-05-23 RX ORDER — CLINDAMYCIN PHOSPHATE 10 MG/G
1 GEL TOPICAL 2 TIMES DAILY
Qty: 180 G | Refills: 1 | Status: SHIPPED | OUTPATIENT
Start: 2025-05-23

## 2025-05-23 RX ORDER — CLINDAMYCIN PHOSPHATE 10 MG/G
1 GEL TOPICAL 2 TIMES DAILY
Qty: 180 G | Refills: 1 | Status: SHIPPED | OUTPATIENT
Start: 2025-05-23 | End: 2025-05-23 | Stop reason: SDUPTHER

## 2025-06-12 DIAGNOSIS — J30.1 SEASONAL ALLERGIC RHINITIS DUE TO POLLEN: ICD-10-CM

## 2025-06-12 DIAGNOSIS — M10.9 GOUT, UNSPECIFIED CAUSE, UNSPECIFIED CHRONICITY, UNSPECIFIED SITE: ICD-10-CM

## 2025-06-12 RX ORDER — ALLOPURINOL 100 MG/1
100 TABLET ORAL DAILY
Qty: 90 TABLET | Refills: 0 | Status: SHIPPED | OUTPATIENT
Start: 2025-06-12

## 2025-06-12 RX ORDER — LEVOCETIRIZINE DIHYDROCHLORIDE 5 MG/1
5 TABLET, FILM COATED ORAL EVERY EVENING
Qty: 90 TABLET | Refills: 0 | Status: SHIPPED | OUTPATIENT
Start: 2025-06-12

## 2025-07-01 DIAGNOSIS — L21.9 SEBORRHEIC DERMATITIS OF SCALP: Primary | ICD-10-CM

## 2025-07-03 RX ORDER — CLOBETASOL PROPIONATE 0.5 MG/ML
SOLUTION TOPICAL 2 TIMES DAILY
Qty: 150 ML | Refills: 1 | Status: SHIPPED | OUTPATIENT
Start: 2025-07-03 | End: 2026-07-03

## 2025-07-11 NOTE — PROGRESS NOTES
PERIODIC EXAM, ADULT PROPHY , 1 PA #28   REVIEWED MED HX: meds, allergies, health changes reviewed in [facility]. All consents signed.  CHIEF CONCERN: no dental pain or concerns  PAIN SCALE:  0  ASA CLASS:  II  PLAQUE:  moderate  CALCULUS:  light  BLEEDING:   moderate  STAIN :   light      PERIO: severe attrition.     Hygiene Procedures:  Scaled, Polished, Flossed    Oral Hygiene Instruction: Brushing Minimum 2x daily for 2 minutes, daily flossing    Dispensed: Toothbrush, Toothpaste, Floss    Visual and Tactile Intraoral/ Extraoral evaluation: Oral and Oropharyngeal cancer evaluation. No findings     Dr. Lau   Reviewed with patient clinical and radiographic findings and patient verbalized understanding. All questions and concerns addressed.     REFERRALS: none    CARIES FINDINGS: see tooth chart       TREATMENT  PLAN :   NV: extraction #28 ( PAP present/ showed mom on radiograph/ dr Lau to rx antibiotic/ pharmacy verified)    Next Recall: 6 month recall     Last BWX: 1/6/25  Last Panorex/ FMX : 1/25/18. Update when panorex working

## 2025-07-14 ENCOUNTER — OFFICE VISIT (OUTPATIENT)
Dept: DENTISTRY | Facility: CLINIC | Age: 45
End: 2025-07-14

## 2025-07-14 VITALS — SYSTOLIC BLOOD PRESSURE: 106 MMHG | DIASTOLIC BLOOD PRESSURE: 71 MMHG | HEART RATE: 60 BPM

## 2025-07-14 DIAGNOSIS — K03.0: ICD-10-CM

## 2025-07-14 DIAGNOSIS — Z01.20 ENCOUNTER FOR DENTAL EXAM AND CLEANING W/O ABNORMAL FINDINGS: Primary | ICD-10-CM

## 2025-07-14 PROCEDURE — D0220 INTRAORAL - PERIAPICAL FIRST RADIOGRAPHIC IMAGE: HCPCS

## 2025-07-14 PROCEDURE — D0120 PERIODIC ORAL EVALUATION - ESTABLISHED PATIENT: HCPCS | Performed by: DENTIST

## 2025-07-14 PROCEDURE — D1110 PROPHYLAXIS - ADULT: HCPCS

## 2025-07-14 RX ORDER — AMOXICILLIN 500 MG/1
500 CAPSULE ORAL EVERY 8 HOURS SCHEDULED
Qty: 21 CAPSULE | Refills: 0 | Status: SHIPPED | OUTPATIENT
Start: 2025-07-14 | End: 2025-07-21

## 2025-08-15 ENCOUNTER — OFFICE VISIT (OUTPATIENT)
Dept: DENTISTRY | Facility: CLINIC | Age: 45
End: 2025-08-15